# Patient Record
Sex: FEMALE | Race: WHITE | Employment: UNEMPLOYED | URBAN - METROPOLITAN AREA
[De-identification: names, ages, dates, MRNs, and addresses within clinical notes are randomized per-mention and may not be internally consistent; named-entity substitution may affect disease eponyms.]

---

## 2020-11-23 ENCOUNTER — HOSPITAL ENCOUNTER (INPATIENT)
Facility: HOSPITAL | Age: 65
LOS: 2 days | Discharge: HOME | DRG: 683 | End: 2020-11-25
Attending: EMERGENCY MEDICINE | Admitting: HOSPITALIST
Payer: COMMERCIAL

## 2020-11-23 ENCOUNTER — APPOINTMENT (OUTPATIENT)
Dept: CT IMAGING | Facility: HOSPITAL | Age: 65
DRG: 683 | End: 2020-11-23
Attending: EMERGENCY MEDICINE
Payer: COMMERCIAL

## 2020-11-23 DIAGNOSIS — E87.1 HYPONATREMIA: Primary | ICD-10-CM

## 2020-11-23 DIAGNOSIS — T22.242A: ICD-10-CM

## 2020-11-23 DIAGNOSIS — E87.6 HYPOKALEMIA: ICD-10-CM

## 2020-11-23 DIAGNOSIS — R55 SYNCOPE AND COLLAPSE: ICD-10-CM

## 2020-11-23 PROCEDURE — 99223 1ST HOSP IP/OBS HIGH 75: CPT | Performed by: HOSPITALIST

## 2020-11-23 PROCEDURE — 70450 CT HEAD/BRAIN W/O DYE: CPT | Performed by: EMERGENCY MEDICINE

## 2020-11-23 RX ORDER — METOCLOPRAMIDE HYDROCHLORIDE 5 MG/ML
5 INJECTION INTRAMUSCULAR; INTRAVENOUS EVERY 8 HOURS PRN
Status: DISCONTINUED | OUTPATIENT
Start: 2020-11-23 | End: 2020-11-25

## 2020-11-23 RX ORDER — ONDANSETRON 2 MG/ML
4 INJECTION INTRAMUSCULAR; INTRAVENOUS EVERY 4 HOURS PRN
Status: DISCONTINUED | OUTPATIENT
Start: 2020-11-23 | End: 2020-11-23

## 2020-11-23 RX ORDER — LISINOPRIL 10 MG/1
10 TABLET ORAL NIGHTLY
COMMUNITY

## 2020-11-23 RX ORDER — ONDANSETRON 2 MG/ML
4 INJECTION INTRAMUSCULAR; INTRAVENOUS EVERY 6 HOURS PRN
Status: DISCONTINUED | OUTPATIENT
Start: 2020-11-23 | End: 2020-11-25

## 2020-11-23 RX ORDER — ACETAMINOPHEN 325 MG/1
650 TABLET ORAL EVERY 6 HOURS PRN
Status: DISCONTINUED | OUTPATIENT
Start: 2020-11-23 | End: 2020-11-25

## 2020-11-23 RX ORDER — SODIUM CHLORIDE 0.9 % (FLUSH) 0.9 %
3 SYRINGE (ML) INJECTION AS NEEDED
Status: DISCONTINUED | OUTPATIENT
Start: 2020-11-23 | End: 2020-11-25

## 2020-11-23 RX ORDER — SODIUM CHLORIDE 9 MG/ML
INJECTION, SOLUTION INTRAVENOUS CONTINUOUS
Status: ACTIVE | OUTPATIENT
Start: 2020-11-23 | End: 2020-11-23

## 2020-11-23 RX ORDER — POTASSIUM CHLORIDE 20 MEQ/1
40 TABLET, EXTENDED RELEASE ORAL ONCE
Status: COMPLETED | OUTPATIENT
Start: 2020-11-23 | End: 2020-11-23

## 2020-11-23 RX ORDER — KETOROLAC TROMETHAMINE 15 MG/ML
15 INJECTION, SOLUTION INTRAMUSCULAR; INTRAVENOUS ONCE
Status: COMPLETED | OUTPATIENT
Start: 2020-11-23 | End: 2020-11-23

## 2020-11-23 RX ORDER — HEPARIN SODIUM 5000 [USP'U]/ML
5000 INJECTION, SOLUTION INTRAVENOUS; SUBCUTANEOUS EVERY 12 HOURS SCHEDULED
Status: DISCONTINUED | OUTPATIENT
Start: 2020-11-23 | End: 2020-11-25
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
## 2020-11-23 NOTE — H&P
[facility]    PATIENT'S NAME: Honorio Houston   ATTENDING PHYSICIAN: Ada Menendez.  Magy Smith MD   PATIENT ACCOUNT#:   [id number]    LOCATION:  [place] [address]  MEDICAL RECORD #:   [id number]       YOB: 1955  ADMISSION DATE: had non-Hodgkin's lymphoma and heart disease. Father had diabetes mellitus type 2. SOCIAL HISTORY:  Ex-tobacco user. No current tobacco, alcohol, or drug use. Lives with her family. Dependent for basic activities of daily living.     REVIEW OF SYSTEM Syncope secondary to acute renal failure and hypovolemia which may be related to poor oral intake, recent diarrhea from opiate withdrawal, and the fact that she is on lisinopril and hydrochlorothiazide. 2.   Hypokalemia.   3.   Opiate withdrawal, which ha

## 2020-11-23 NOTE — ED NOTES
Orders for admission, patient is aware of plan and ready to go upstairs.  Any questions, please call ED RN Tennille Carrasco  at extension [phone]   Type of COVID test sent: rapid  COVID Suspicion level: Low    Titratable drug(s) infusing: none  Rate: n/a    LOC at time

## 2020-11-24 ENCOUNTER — APPOINTMENT (OUTPATIENT)
Dept: CV DIAGNOSTICS | Facility: HOSPITAL | Age: 65
DRG: 683 | End: 2020-11-24
Attending: HOSPITALIST
Payer: COMMERCIAL

## 2020-11-24 PROCEDURE — 99233 SBSQ HOSP IP/OBS HIGH 50: CPT | Performed by: HOSPITALIST

## 2020-11-24 PROCEDURE — 93306 TTE W/DOPPLER COMPLETE: CPT | Performed by: HOSPITALIST

## 2020-11-24 RX ORDER — KETOROLAC TROMETHAMINE 30 MG/ML
30 INJECTION, SOLUTION INTRAMUSCULAR; INTRAVENOUS EVERY 6 HOURS PRN
Status: DISCONTINUED | OUTPATIENT
Start: 2020-11-24 | End: 2020-11-25

## 2020-11-24 RX ORDER — POTASSIUM CHLORIDE 20 MEQ/1
40 TABLET, EXTENDED RELEASE ORAL EVERY 4 HOURS
Status: COMPLETED | OUTPATIENT
Start: 2020-11-24 | End: 2020-11-24

## 2020-11-24 NOTE — PROGRESS NOTES
[facility]    Progress Note    Tanisha Roe Patient Status:  Inpatient    1955 MRN [id number]   Location [facility] 4W/SW/SE Attending Elise Crenshaw MD   Hosp Day # 1 PCP Jag Robledo       Subjective:   Annia Ortega Dictated by (CST): Vikash Durant MD on 11/23/2020 at 3:43 PM     Finalized by (CST): Vikash Durant MD on 11/23/2020 at 3:45 PM          Ekg 12-lead    Result Date: 11/23/2020  ECG Report  Interpretation  -------------------------- Sinus Rhythm Nonspecific T
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
## 2020-11-25 VITALS
WEIGHT: 167.56 LBS | SYSTOLIC BLOOD PRESSURE: 118 MMHG | OXYGEN SATURATION: 98 % | HEART RATE: 70 BPM | HEIGHT: 64 IN | BODY MASS INDEX: 28.6 KG/M2 | TEMPERATURE: 99 F | DIASTOLIC BLOOD PRESSURE: 80 MMHG | RESPIRATION RATE: 19 BRPM

## 2020-11-25 PROCEDURE — 99239 HOSP IP/OBS DSCHRG MGMT >30: CPT | Performed by: INTERNAL MEDICINE

## 2020-11-25 NOTE — DISCHARGE SUMMARY
[facility]    Discharge Summary    Jenae Mcnair Patient Status:  Inpatient    1955 MRN [id number]   Location [facility] 4W/SW/SE Attending Savanna Aggarwal MD   Hosp Day # 2 PCP Mili Lozada     Date of Admission: 6 alert, in no acute distress. HEENT: Normocephalic. Extraocular muscles were intact. PERRL. NECK:  Supple. Trach midline  CHEST:  Symmetrical movement on inspiration  HEART:  S1 and S2 heard. RRR   LUNGS:  Air entry was good.   No crackles or wheezes WBC 5.9 4.2 4.1   .0 294.0 281.0     Recent Labs   Lab 11/23/20  1425 11/24/20  0721 11/25/20  0147   * 97 92   BUN 16 13 11   CREATSERUM 1.70* 1.09* 1.08*   GFRAA 36* 62 63   GFRNAA 32* 54* 55*   CA 9.1 8.9 8.6   * 128* 134*   K 2.8*
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
## 2020-11-25 NOTE — DIETARY NOTE
ADULT NUTRITION INITIAL ASSESSMENT    Pt is at moderate nutrition risk. Pt does not meet malnutrition criteria.       RECOMMENDATIONS TO MD:  See Nutrition Intervention-- RD encouraged PO intake      NUTRITION DIAGNOSIS/PROBLEM:  Inadequate oral intake rel History:   Diagnosis Date   • Abnormal mammogram    • OBESITY    • OSTEOARTHRITIS    • OTHER DISEASES     arthritis       ANTHROPOMETRICS:  HT: 162.6 cm (5' 4\")  WT: 76 kg (167 lb 8.8 oz)   BMI: Body mass index is 28.76 kg/m². BMI CLASSIFICATION: 25-29. 9 Nutrition Goals:      halt wt loss and PO greater than 75% of meals      DIETITIAN FOLLOW UP: RD to follow.     Niru Gunn, [address], [address]

## 2020-11-25 NOTE — PLAN OF CARE
Problem: Patient Centered Care  Goal: Patient preferences are identified and integrated in the patient's plan of care  Description: Interventions:  - What would you like us to know as we care for you?  From home with   - Provide timely, complete, a neutropenic period  Description: INTERVENTIONS  - Monitor WBC  - Administer growth factors as ordered  - Implement neutropenic guidelines  Outcome: Progressing     Problem: SAFETY ADULT - FALL  Goal: Free from fall injury  Description: INTERVENTIONS:  - As breakdown  - Initiate interventions, skin care algorithm/standards of care as needed  Outcome: Progressing  Goal: Incision(s), wounds(s) or drain site(s) healing without S/S of infection  Description: INTERVENTIONS:  - Assess and document risk factors for

## 2020-11-25 NOTE — PLAN OF CARE
Toradol available q6h for pain per MAR. Wound RN Tammi Jones evaluated blisters and dressed per orders. Dressings clean/dry/intact. Up with assist. Echo done.  K protocol initiated  Problem: PAIN - ADULT  Goal: Verbalizes/displays adequate comfort level or patient barriers to discharge w/pt and caregiver  - Include patient/family/discharge partner in discharge planning  - Arrange for needed discharge resources and transportation as appropriate  - Identify discharge learning needs (meds, wound care, etc)  - Arrange f infection  Description: INTERVENTIONS:  - Assess and document risk factors for pressure ulcer development  - Assess and document skin integrity  - Assess and document dressing/incision, wound bed, drain sites and surrounding tissue  - Implement wound care

## 2020-11-27 NOTE — CM/SW NOTE
AI followed up with Mesfin Ramírez after reaching out to  regarding wound care. CM attached wound care discharge instructions to AVS. CM encouraged pt to call and talk to a nurse for follow up appt at her [place] office.    AI explained that she could use any non-st
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
